# Patient Record
Sex: FEMALE | Race: WHITE | NOT HISPANIC OR LATINO | Employment: FULL TIME | URBAN - METROPOLITAN AREA
[De-identification: names, ages, dates, MRNs, and addresses within clinical notes are randomized per-mention and may not be internally consistent; named-entity substitution may affect disease eponyms.]

---

## 2021-10-29 ENCOUNTER — TELEPHONE (OUTPATIENT)
Dept: PSYCHIATRY | Facility: CLINIC | Age: 21
End: 2021-10-29

## 2021-11-05 ENCOUNTER — TELEPHONE (OUTPATIENT)
Dept: PSYCHIATRY | Facility: CLINIC | Age: 21
End: 2021-11-05

## 2022-02-15 ENCOUNTER — TELEPHONE (OUTPATIENT)
Dept: PSYCHIATRY | Facility: CLINIC | Age: 22
End: 2022-02-15

## 2022-07-23 ENCOUNTER — APPOINTMENT (EMERGENCY)
Dept: RADIOLOGY | Facility: HOSPITAL | Age: 22
End: 2022-07-23
Payer: COMMERCIAL

## 2022-07-23 ENCOUNTER — HOSPITAL ENCOUNTER (EMERGENCY)
Facility: HOSPITAL | Age: 22
Discharge: HOME | End: 2022-07-23
Attending: EMERGENCY MEDICINE | Admitting: EMERGENCY MEDICINE
Payer: COMMERCIAL

## 2022-07-23 VITALS
RESPIRATION RATE: 18 BRPM | SYSTOLIC BLOOD PRESSURE: 130 MMHG | OXYGEN SATURATION: 99 % | TEMPERATURE: 97.4 F | BODY MASS INDEX: 23.39 KG/M2 | DIASTOLIC BLOOD PRESSURE: 74 MMHG | HEART RATE: 80 BPM | WEIGHT: 104 LBS | HEIGHT: 56 IN

## 2022-07-23 DIAGNOSIS — R07.89 NON-CARDIAC CHEST PAIN: Primary | ICD-10-CM

## 2022-07-23 LAB
AMORPH PHOS CRY URNS QL MICRO: NORMAL /HPF
ANION GAP SERPL CALCULATED.3IONS-SCNC: 14 MMOL/L (ref 4–13)
BACTERIA UR QL AUTO: NORMAL /HPF
BASOPHILS # BLD AUTO: 0.03 THOUSANDS/ΜL (ref 0–0.1)
BASOPHILS NFR BLD AUTO: 0 % (ref 0–1)
BILIRUB UR QL STRIP: NEGATIVE
BUN SERPL-MCNC: 15 MG/DL (ref 5–25)
CALCIUM SERPL-MCNC: 8.5 MG/DL (ref 8.3–10.1)
CARDIAC TROPONIN I PNL SERPL HS: <2 NG/L
CHLORIDE SERPL-SCNC: 106 MMOL/L (ref 96–108)
CLARITY UR: CLEAR
CO2 SERPL-SCNC: 21 MMOL/L (ref 21–32)
COLOR UR: YELLOW
CREAT SERPL-MCNC: 0.68 MG/DL (ref 0.6–1.3)
EOSINOPHIL # BLD AUTO: 0.04 THOUSAND/ΜL (ref 0–0.61)
EOSINOPHIL NFR BLD AUTO: 0 % (ref 0–6)
ERYTHROCYTE [DISTWIDTH] IN BLOOD BY AUTOMATED COUNT: 12.8 % (ref 11.6–15.1)
EXT PREG TEST URINE: NEGATIVE
EXT. CONTROL ED NAV: NORMAL
GFR SERPL CREATININE-BSD FRML MDRD: 122 ML/MIN/1.73SQ M
GLUCOSE SERPL-MCNC: 76 MG/DL (ref 65–140)
GLUCOSE UR STRIP-MCNC: NEGATIVE MG/DL
HCT VFR BLD AUTO: 40.9 % (ref 34.8–46.1)
HGB BLD-MCNC: 13.3 G/DL (ref 11.5–15.4)
HGB UR QL STRIP.AUTO: ABNORMAL
IMM GRANULOCYTES # BLD AUTO: 0.01 THOUSAND/UL (ref 0–0.2)
IMM GRANULOCYTES NFR BLD AUTO: 0 % (ref 0–2)
KETONES UR STRIP-MCNC: ABNORMAL MG/DL
LEUKOCYTE ESTERASE UR QL STRIP: NEGATIVE
LYMPHOCYTES # BLD AUTO: 2.09 THOUSANDS/ΜL (ref 0.6–4.47)
LYMPHOCYTES NFR BLD AUTO: 22 % (ref 14–44)
MCH RBC QN AUTO: 29.8 PG (ref 26.8–34.3)
MCHC RBC AUTO-ENTMCNC: 32.5 G/DL (ref 31.4–37.4)
MCV RBC AUTO: 92 FL (ref 82–98)
MONOCYTES # BLD AUTO: 0.7 THOUSAND/ΜL (ref 0.17–1.22)
MONOCYTES NFR BLD AUTO: 8 % (ref 4–12)
NEUTROPHILS # BLD AUTO: 6.49 THOUSANDS/ΜL (ref 1.85–7.62)
NEUTS SEG NFR BLD AUTO: 70 % (ref 43–75)
NITRITE UR QL STRIP: NEGATIVE
NON-SQ EPI CELLS URNS QL MICRO: NORMAL /HPF
NRBC BLD AUTO-RTO: 0 /100 WBCS
PH UR STRIP.AUTO: 8.5 [PH]
PLATELET # BLD AUTO: 225 THOUSANDS/UL (ref 149–390)
PMV BLD AUTO: 10.5 FL (ref 8.9–12.7)
POTASSIUM SERPL-SCNC: 3.4 MMOL/L (ref 3.5–5.3)
PROT UR STRIP-MCNC: NEGATIVE MG/DL
RBC # BLD AUTO: 4.47 MILLION/UL (ref 3.81–5.12)
RBC #/AREA URNS AUTO: NORMAL /HPF
SODIUM SERPL-SCNC: 141 MMOL/L (ref 135–147)
SP GR UR STRIP.AUTO: 1.01 (ref 1–1.03)
UROBILINOGEN UR QL STRIP.AUTO: 1 E.U./DL
WBC # BLD AUTO: 9.36 THOUSAND/UL (ref 4.31–10.16)
WBC #/AREA URNS AUTO: NORMAL /HPF

## 2022-07-23 PROCEDURE — 99285 EMERGENCY DEPT VISIT HI MDM: CPT

## 2022-07-23 PROCEDURE — 71045 X-RAY EXAM CHEST 1 VIEW: CPT

## 2022-07-23 PROCEDURE — 93005 ELECTROCARDIOGRAM TRACING: CPT

## 2022-07-23 PROCEDURE — 84484 ASSAY OF TROPONIN QUANT: CPT

## 2022-07-23 PROCEDURE — 81025 URINE PREGNANCY TEST: CPT

## 2022-07-23 PROCEDURE — 80048 BASIC METABOLIC PNL TOTAL CA: CPT

## 2022-07-23 PROCEDURE — 81001 URINALYSIS AUTO W/SCOPE: CPT

## 2022-07-23 PROCEDURE — 36415 COLL VENOUS BLD VENIPUNCTURE: CPT

## 2022-07-23 PROCEDURE — 99284 EMERGENCY DEPT VISIT MOD MDM: CPT

## 2022-07-23 PROCEDURE — 85025 COMPLETE CBC W/AUTO DIFF WBC: CPT

## 2022-07-23 RX ORDER — HYDROXYZINE HYDROCHLORIDE 25 MG/1
25 TABLET, FILM COATED ORAL ONCE
Status: COMPLETED | OUTPATIENT
Start: 2022-07-23 | End: 2022-07-23

## 2022-07-23 RX ORDER — IBUPROFEN 600 MG/1
600 TABLET ORAL ONCE
Status: COMPLETED | OUTPATIENT
Start: 2022-07-23 | End: 2022-07-23

## 2022-07-23 RX ADMIN — HYDROXYZINE HYDROCHLORIDE 25 MG: 25 TABLET, FILM COATED ORAL at 15:36

## 2022-07-23 RX ADMIN — IBUPROFEN 600 MG: 600 TABLET, FILM COATED ORAL at 15:36

## 2022-07-23 NOTE — ED PROVIDER NOTES
History  Chief Complaint   Patient presents with    Shortness of Breath     States SOB and chest tightness started yesterday  Went to [facility] yesterday     This is a 24 y/o female with PMH bipolar disorder here for chest tightness and shortness of breath that started two days ago  Yesterday she went to [facility] ER for the same symptoms  She describes her chest pain as a 7/10 and says it is in the middle of her chest from her neck down to her xiphoid  She describes it as a squeezing or a tightness and it physically hurts to the touch  She also admits to shortness of breath that has been consistent both at rest and with activity  She describes it as "breathing fast " She says these symptoms have stayed the same for the past three days  Says she does heavy lifting at work  Denies any abdominal pain, fever, chills, nausea, vomiting, URI symptoms  Denies recent travel, being on birth control, sick contacts, recent hospitalizations other than her ER visit yesterday  She admits that she was started on Lamictal 3 weeks ago which is a new medication for her and she also takes hydroxyzine at night  States she has had panic attacks in the past but this feels different  She called her PCP but has not been able to see them yet  Denies thoughts of hurting herself or others, suicidal or homicidal thoughts, hearing voices or seeing things  No known allergies         History provided by:  Patient   used: No    Shortness of Breath  Severity:  Mild  Onset quality:  Gradual  Duration:  2 days  Timing:  Constant  Progression:  Unchanged  Chronicity:  Recurrent  Ineffective treatments:  NSAIDs  Associated symptoms: chest pain    Associated symptoms: no abdominal pain, no cough, no ear pain, no fever, no headaches, no hemoptysis, no neck pain, no sore throat, no vomiting and no wheezing    Risk factors: no hx of cancer, no hx of PE/DVT, no oral contraceptive use, no prolonged immobilization and no recent surgery        None       History reviewed  No pertinent past medical history  Past Surgical History:   Procedure Laterality Date    NASAL SEPTUM SURGERY         History reviewed  No pertinent family history  I have reviewed and agree with the history as documented  E-Cigarette/Vaping    E-Cigarette Use Never User      E-Cigarette/Vaping Substances    Nicotine No     THC No     CBD No     Flavoring No     Other No     Unknown No      Social History     Tobacco Use    Smoking status: Never Smoker    Smokeless tobacco: Never Used   Vaping Use    Vaping Use: Never used   Substance Use Topics    Alcohol use: Never    Drug use: Never       Review of Systems   Constitutional: Negative for chills and fever  HENT: Negative for congestion, ear pain and sore throat  Respiratory: Positive for chest tightness and shortness of breath  Negative for cough, hemoptysis and wheezing  Cardiovascular: Positive for chest pain  Negative for palpitations and leg swelling  Gastrointestinal: Negative for abdominal pain, nausea and vomiting  Musculoskeletal: Negative for back pain and neck pain  Skin: Negative for color change  Neurological: Negative for numbness and headaches  Psychiatric/Behavioral: Negative for behavioral problems and sleep disturbance  All other systems reviewed and are negative  Physical Exam  Physical Exam  Vitals and nursing note reviewed  Constitutional:       General: She is awake  Appearance: Normal appearance  She is well-developed  HENT:      Head: Normocephalic and atraumatic  Right Ear: Tympanic membrane, ear canal and external ear normal       Left Ear: Tympanic membrane, ear canal and external ear normal       Nose: Nose normal       Mouth/Throat:      Lips: Pink  Pharynx: Oropharynx is clear  Uvula midline  Eyes:      General: No scleral icterus  Extraocular Movements: Extraocular movements intact        Pupils: Pupils are equal, round, and reactive to light  Cardiovascular:      Rate and Rhythm: Normal rate and regular rhythm  Heart sounds: Normal heart sounds, S1 normal and S2 normal  No murmur heard  No gallop  Pulmonary:      Effort: Pulmonary effort is normal  No tachypnea, accessory muscle usage or respiratory distress  Breath sounds: Normal breath sounds  No decreased air movement  No decreased breath sounds, wheezing, rhonchi or rales  Comments: Lungs clear B/L  There is no wheezing, rales, rhonchi, decreased breath sounds or decreased air movement  Effort is normal  There is no tachypnea, accessory muscle usage or respiratory distress  Chest:      Chest wall: Tenderness present  No crepitus  Comments: There is reproducible chest tenderness along the middle of the chest wall  There is no notable bruising or swelling  Abdominal:      General: Abdomen is flat  Bowel sounds are normal       Palpations: Abdomen is soft  Tenderness: There is no abdominal tenderness  Comments: Abdomen is flat, soft  BS normal  There is no abdominal tenderness, rebound, guarding, rigidity noted  Musculoskeletal:         General: Normal range of motion  Cervical back: Full passive range of motion without pain, normal range of motion and neck supple  Right lower leg: No edema  Left lower leg: No edema  Skin:     General: Skin is warm and dry  Neurological:      General: No focal deficit present  Mental Status: She is alert  Psychiatric:         Attention and Perception: Attention and perception normal          Mood and Affect: Mood is anxious  Speech: Speech normal          Behavior: Behavior normal  Behavior is cooperative  Thought Content: Thought content does not include homicidal or suicidal ideation           Cognition and Memory: Cognition normal          Judgment: Judgment normal       Comments: Patient is anxious on exam  Her speech is quiet but rate and affect normal  Denies thoughts of hurting herself or others, suicidal ideations, homicidal ideations or plans  Denies delusions or hallucinations           Vital Signs  ED Triage Vitals [07/23/22 1324]   Temperature Pulse Respirations Blood Pressure SpO2   (!) 97 4 °F (36 3 °C) 88 18 125/79 99 %      Temp src Heart Rate Source Patient Position - Orthostatic VS BP Location FiO2 (%)   -- Monitor Sitting Right arm --      Pain Score       6           Vitals:    07/23/22 1324 07/23/22 1400 07/23/22 1500 07/23/22 1600   BP: 125/79 125/81 122/78 130/74   Pulse: 88 83 80 80   Patient Position - Orthostatic VS: Sitting Sitting Lying Held         Visual Acuity      ED Medications  Medications   hydrOXYzine HCL (ATARAX) tablet 25 mg (25 mg Oral Given 7/23/22 1536)   ibuprofen (MOTRIN) tablet 600 mg (600 mg Oral Given 7/23/22 1536)       Diagnostic Studies  Results Reviewed     Procedure Component Value Units Date/Time    HS Troponin 0hr (reflex protocol) [[id number]]  (Normal) Collected: 07/23/22 1539    Lab Status: Final result Specimen: Blood from Arm, Right Updated: 07/23/22 1613     hs TnI 0hr <2 ng/L     Basic metabolic panel [[id number]]  (Abnormal) Collected: 07/23/22 1539    Lab Status: Final result Specimen: Blood from Arm, Right Updated: 07/23/22 1600     Sodium 141 mmol/L      Potassium 3 4 mmol/L      Chloride 106 mmol/L      CO2 21 mmol/L      ANION GAP 14 mmol/L      BUN 15 mg/dL      Creatinine 0 68 mg/dL      Glucose 76 mg/dL      Calcium 8 5 mg/dL      eGFR 122 ml/min/1 73sq m     Narrative:      [place] guidelines for Chronic Kidney Disease (CKD):     Stage 1 with normal or high GFR (GFR > 90 mL/min/1 73 square meters)    Stage 2 Mild CKD (GFR = 60-89 mL/min/1 73 square meters)    Stage 3A Moderate CKD (GFR = 45-59 mL/min/1 73 square meters)    Stage 3B Moderate CKD (GFR = 30-44 mL/min/1 73 square meters)    Stage 4 Severe CKD (GFR = 15-29 mL/min/1 73 square meters)    Stage 5 End Stage CKD (GFR <15 mL/min/1 73 square meters)  Note: GFR calculation is accurate only with a steady state creatinine    CBC and differential [[id number]] Collected: 07/23/22 1539    Lab Status: Final result Specimen: Blood from Arm, Right Updated: 07/23/22 1546     WBC 9 36 Thousand/uL      RBC 4 47 Million/uL      Hemoglobin 13 3 g/dL      Hematocrit 40 9 %      MCV 92 fL      MCH 29 8 pg      MCHC 32 5 g/dL      RDW 12 8 %      MPV 10 5 fL      Platelets 692 Thousands/uL      nRBC 0 /100 WBCs      Neutrophils Relative 70 %      Immat GRANS % 0 %      Lymphocytes Relative 22 %      Monocytes Relative 8 %      Eosinophils Relative 0 %      Basophils Relative 0 %      Neutrophils Absolute 6 49 Thousands/µL      Immature Grans Absolute 0 01 Thousand/uL      Lymphocytes Absolute 2 09 Thousands/µL      Monocytes Absolute 0 70 Thousand/µL      Eosinophils Absolute 0 04 Thousand/µL      Basophils Absolute 0 03 Thousands/µL     Urine Microscopic [[id number]] Collected: 07/23/22 1513    Lab Status: Final result Specimen: Urine, Clean Catch Updated: 07/23/22 1539     RBC, UA 2-4 /hpf      WBC, UA 1-2 /hpf      Epithelial Cells Occasional /hpf      Bacteria, UA Occasional /hpf      AMORPH PHOSPATES Moderate /hpf     UA w Reflex to Microscopic w Reflex to Culture [[id number]]  (Abnormal) Collected: 07/23/22 1513    Lab Status: Final result Specimen: Urine, Clean Catch Updated: 07/23/22 1523     Color, UA Yellow     Clarity, UA Clear     Specific Gravity, UA 1 015     pH, UA 8 5     Leukocytes, UA Negative     Nitrite, UA Negative     Protein, UA Negative mg/dl      Glucose, UA Negative mg/dl      Ketones, UA 15 (1+) mg/dl      Urobilinogen, UA 1 0 E U /dl      Bilirubin, UA Negative     Occult Blood, UA Trace-lysed    POCT pregnancy, urine [[id number]]  (Normal) Resulted: 07/23/22 1520    Lab Status: Final result Updated: 07/23/22 1520     EXT PREG TEST UR (Ref: Negative) negative     Control valid                 X-ray chest 1 view portable    (Results Pending)              Procedures  ECG 12 Lead Documentation Only    Date/Time: 7/23/2022 5:18 PM  Performed by: Madina Workman PA-C  Authorized by: Madina Workman PA-C     Indications / Diagnosis:  Chest tightness, SOB  Patient location:  ED  Previous ECG:     Previous ECG:  Unavailable  Interpretation:     Interpretation: normal    Rate:     ECG rate:  78    ECG rate assessment: normal    Rhythm:     Rhythm: sinus rhythm    Comments:      HR 78 bpm  Normal sinus rhythm   No signs of acute ischemia, ectopy, arrhythmias             ED Course  ED Course as of 07/23/22 1859   Sat Jul 23, 2022   1610 Patient feels much better after receiving the hydroxyzine and motrin             HEART Risk Score    Flowsheet Row Most Recent Value   Heart Score Risk Calculator    History 0 Filed at: 07/23/2022 1614   ECG 0 Filed at: 07/23/2022 1614   Age 0 Filed at: 07/23/2022 1614   Risk Factors 0 Filed at: 07/23/2022 1614   Troponin 0 Filed at: 07/23/2022 1614   HEART Score 0 Filed at: 07/23/2022 1614                PERC Rule for PE    Flowsheet Row Most Recent Value   PERC Rule for PE    Age >=50 0 Filed at: 07/23/2022 1857   HR >=100 0 Filed at: 07/23/2022 1857   O2 Sat on room air < 95% 0 Filed at: 07/23/2022 1857   History of PE or DVT 0 Filed at: 07/23/2022 1857   Recent trauma or surgery 0 Filed at: 07/23/2022 1857   Hemoptysis 0 Filed at: 07/23/2022 1857   Exogenous estrogen 0 Filed at: 07/23/2022 1857   Unilateral leg swelling 0 Filed at: 07/23/2022 1857   PERC Rule for PE Results 0 Filed at: 07/23/2022 1857                  Wells' Criteria for PE    Flowsheet Row Most Recent Value   Wells' Criteria for PE    Clinical signs and symptoms of DVT 0 Filed at: 07/23/2022 1452   PE is primary diagnosis or equally likely 0 Filed at: 07/23/2022 1452   HR >100 0 Filed at: 07/23/2022 1452   Immobilization at least 3 days or Surgery in the previous 4 weeks 0 Filed at: 07/23/2022 1452   Previous, objectively diagnosed PE or DVT 0 Filed at: 07/23/2022 1452   Hemoptysis 0 Filed at: 07/23/2022 1452   Malignancy with treatment within 6 months or palliative 0 Filed at: 07/23/2022 1452   Wells' Criteria Total 0 Filed at: 07/23/2022 1452                [facility]  Number of Diagnoses or Management Options  Non-cardiac chest pain: new and requires workup  Diagnosis management comments: 22 y/o female here for chest pain and shortness of breath over the past 3 days  Patient went to [facility] ER yesterday for the same symptoms and had a chest pain workup done which was all negative  She describes her chest pain as tightness/squeezing and she feels like she is breathing fast  Patient has bipolar disorder and was recently started on lamictal 3 weeks ago  But says she has had panic attacks in the past and this feels different  No cardiac history, family history of young cardiac problems, asthma  ROS otherwise negative  Vitals stable  PE benign  Pt notably anxious when speaking  O2 sats 100%  Ordered CBC, CMP, Trop, EKG, CXR, UA, urine preg  Differential ACS, anxiety, costochondritis, pregnancy, anemia, dehydration  Gave patient hydroxyzine and motrin in ER for symptoms and she said this helping significantly  Labs and imaging all unremarkable  Wells 0 and heart score 0  PERCed out at 0  Patient stable to be discharged with instructions to take hydroxyzine and motrin every 4-6 hours  Instructed patient that she should follow up with her psychiatrist as this might be related to her new change in medication of lamictal  Also advised her to follow up with her PCP regarding her ER visit  She was given strict return to ED precautions both verbally and in discharge papers         Disposition  Final diagnoses:   Non-cardiac chest pain     Time reflects when diagnosis was documented in both MDM as applicable and the Disposition within this note     Time User Action Codes Description Comment    7/23/2022  4:24 PM Doroteo Plasencia Add [R07 [id number]] Non-cardiac chest pain       ED Disposition     ED Disposition   Discharge    Condition   Stable    Date/Time   Sat Jul 23, 2022  4:24 PM    Comment   Aydin Bottoms discharge to home/self care  Follow-up Information     Follow up With Specialties Details Why Contact Info Additional Information    Psychiatry  Call  regarding follow-up evaluation for ER visit      Cathy Arthur MD Internal Medicine Call  for follow-up evaluation regarding ER visit [address]  [place] [address]  [facility] [phone]        [address] Emergency Department Emergency Medicine Go to  if you develop worsening chest pain that is worse/different from before, difficulty breathing such that your lips/skin/nails turn blue, feel faint/lightheaded like you may pass out, feel confused, have thoughts of hurting yourself or others [address] [place] [address], [phone]  [address] [facility] Emergency Department, [address] [place] [address], [facility], [facility] [address]          There are no discharge medications for this patient  No discharge procedures on file      PDMP Review     None          ED Provider  Electronically Signed by           Stefani Gillette PA-C  07/23/22 [id number] Dev Lim PA-C  07/23/22 [id number] Dev Lim PA-C  07/23/22 3557

## 2022-07-23 NOTE — DISCHARGE INSTRUCTIONS
Take 25 mg hydroxyzine every 4-6 hours as needed for chest tightness and shortness of breath symptoms   Take 600 mg ibuprofen every 4-6 hours for chest pain  Make sure you are resting and avoiding lifting heavy objects   You can use ice or heat on your chest to see if it relieves your symptoms   Follow up with your psychiatrist and inform them of your symptoms to see if it is the new medication you were placed on   Return to the ER if you develop worsening chest pain that is worse/different from before, difficulty breathing such that your lips, skin or nails turn blue, feel faint or lightheaded like you may pass out, feel sweaty/nauseous/start to vomit, feel confused, have any thoughts of hurting yourself or others

## 2022-07-24 LAB
ATRIAL RATE: 78 BPM
P AXIS: 46 DEGREES
PR INTERVAL: 138 MS
QRS AXIS: 83 DEGREES
QRSD INTERVAL: 72 MS
QT INTERVAL: 374 MS
QTC INTERVAL: 426 MS
T WAVE AXIS: 44 DEGREES
VENTRICULAR RATE: 78 BPM

## 2022-07-24 PROCEDURE — 93010 ELECTROCARDIOGRAM REPORT: CPT | Performed by: INTERNAL MEDICINE

## 2023-08-15 ENCOUNTER — TELEPHONE (OUTPATIENT)
Dept: PSYCHIATRY | Facility: CLINIC | Age: 23
End: 2023-08-15

## 2023-08-15 NOTE — LETTER
Dear Shaneka Hernandez : We are contacting you because your name is currently included on the [facility] wait-list for Talk Therapy and/or Medication Management. (Please [facility] which services are needed)     In our efforts to provide the highest quality care, [facility] has begun the process of upgrading our behavioral health systems to increase efficiency and expedite delivery of services. As part of this process, we ask you to please confirm your continued interest in the services above. If you are no longer interested or in need, please aida “No” in the area below. If you are still interested and in need, please aida “Yes” and provide your most current demographic and insurance information within 15 days. If we do not receive confirmation from you by 2023 your information will not be included in the system upgrade and your place on the waitlist will be lost.     Thank you in advance for your patience and understanding and we apologize for any inconvenience this may cause. Patient Name and :    Still in need of services: Yes or No     Current Address:     Phone#:     Best time to receive a call: Insurance Carrier:      Policy/ID#: Group#: Insurance Services Phone#:      What is your current presenting problem? Open to virtual talk therapy: Yes or No      We will call you to do an Intake when an appointment becomes available. You can send this information back to us in any of the ways below:    Email: [email or web address] Landrum  Fax#:  [phone]  Mail:   Phyllis [facility]             [address] [facility], [address] [place] [address]

## 2023-08-15 NOTE — TELEPHONE ENCOUNTER
Sent Wait List Letter VIA [employer] . Verify patients need of services from wait list after 15 days.  If no communication remove from NON-REFERRAL wait list.

## 2024-10-01 ENCOUNTER — OFFICE VISIT (OUTPATIENT)
Dept: FAMILY MEDICINE CLINIC | Facility: CLINIC | Age: 24
End: 2024-10-01

## 2024-10-01 ENCOUNTER — TELEPHONE (OUTPATIENT)
Dept: ADMINISTRATIVE | Facility: OTHER | Age: 24
End: 2024-10-01

## 2024-10-01 VITALS
WEIGHT: 100.4 LBS | HEART RATE: 80 BPM | BODY MASS INDEX: 21.66 KG/M2 | SYSTOLIC BLOOD PRESSURE: 96 MMHG | DIASTOLIC BLOOD PRESSURE: 78 MMHG | HEIGHT: 57 IN | TEMPERATURE: 97.8 F | OXYGEN SATURATION: 99 %

## 2024-10-01 DIAGNOSIS — Z23 ENCOUNTER FOR IMMUNIZATION: ICD-10-CM

## 2024-10-01 DIAGNOSIS — F31.9 BIPOLAR DEPRESSION (HCC): ICD-10-CM

## 2024-10-01 DIAGNOSIS — Z00.00 ADULT GENERAL MEDICAL EXAMINATION: Primary | ICD-10-CM

## 2024-10-01 DIAGNOSIS — Z11.1 SCREENING FOR TUBERCULOSIS: ICD-10-CM

## 2024-10-01 PROBLEM — F41.9 ANXIETY: Status: ACTIVE | Noted: 2024-10-01

## 2024-10-01 PROCEDURE — 90471 IMMUNIZATION ADMIN: CPT

## 2024-10-01 PROCEDURE — 99385 PREV VISIT NEW AGE 18-39: CPT

## 2024-10-01 PROCEDURE — 86580 TB INTRADERMAL TEST: CPT

## 2024-10-01 PROCEDURE — 90715 TDAP VACCINE 7 YRS/> IM: CPT

## 2024-10-01 RX ORDER — ARIPIPRAZOLE 15 MG/1
TABLET ORAL
COMMUNITY

## 2024-10-01 RX ORDER — SERTRALINE HYDROCHLORIDE 100 MG/1
TABLET, FILM COATED ORAL
COMMUNITY

## 2024-10-01 RX ORDER — FLUOXETINE 40 MG/1
1 CAPSULE ORAL DAILY
COMMUNITY

## 2024-10-01 NOTE — PROGRESS NOTES
Adult Annual Physical  Name: Katya Maciel      : 2000      MRN: [id number]  Encounter Provider: Marta Zelaya PA-C  Encounter Date: 10/1/2024   Encounter department: [facility]    Assessment & Plan  Adult general medical examination  Patient presents today to establish care with our practice and is due for an annual physical. Patient's medical history and medication list were reviewed and updated. Annual physical questionnaire was given to review current diet, exercise level, sleep health, dental health, visual health, hearing status.    Preventative health needs were reviewed and assessed today:   Immunizations:   Flu -will update at a later time  COVID -no vaccines  Tdap -updated today, counseling was given and patient is agreeable with vaccine being given today  TB test - required by work, will return on Thursday for recheck.  Paperwork was filled out for her work and will be returned to her once TB test is rechecked.    Pap-she believes she had 1 done about 2 years ago but cannot remember where, discussed she will be due for it next year then and she can establish with our [facility]'s at any point in time.    Physical examination completed today.  Patient appears to be in very good health for her age.  She eats a regular diet and stays active with her job as a .  We discussed sleep health as well, recommended melatonin with any trouble falling asleep.    She will follow-up in 1 year or sooner with any acute concerns.  Will return on Thursday for TB test recheck.       Bipolar depression (HCC)  Patient is diagnosed with bipolar depression and anxiety, currently follows with [facility] psychiatry and is prescribed Abilify, Prozac and Zoloft.  She follows with them monthly and states her mental health is stable.  No SI or HI.  Continue regular follow-up.         Encounter for immunization    Orders:    TDAP VACCINE GREATER THAN OR EQUAL TO 8YO IM    Screening  for tuberculosis    Orders:    TB Skin Test    Immunizations and preventive care screenings were discussed with patient today. Appropriate education was printed on patient's after visit summary.    Counseling:  Alcohol/drug use: discussed moderation in alcohol intake, the recommendations for healthy alcohol use, and avoidance of illicit drug use.  Dental Health: discussed importance of regular tooth brushing, flossing, and dental visits.  Injury prevention: discussed safety/seat belts, safety helmets, smoke detectors, carbon monoxide detectors, and smoking near bedding or upholstery.  Sexual health: discussed sexually transmitted diseases, partner selection, use of condoms, avoidance of unintended pregnancy, and contraceptive alternatives.  Exercise: the importance of regular exercise/physical activity was discussed. Recommend exercise 3-5 times per week for at least 30 minutes.          History of Present Illness     Adult Annual Physical:  Patient presents for annual physical.     Diet and Physical Activity:  - Diet/Nutrition: well balanced diet, consuming 3-5 servings of fruits/vegetables daily, adequate fiber intake, adequate whole grain intake and limited junk food.  - Exercise: walking. works as a     Depression Screening:  - PHQ-2 Score: 0    General Health:  - Sleep: 7-8 hours of sleep on average and sleeps well.  - Hearing: normal hearing bilateral ears.  - Vision: no vision problems.  - Dental: regular dental visits and brushes teeth twice daily.    /GYN Health:  - Follows with GYN: yes.   - Menopause: premenopausal.   - Last menstrual cycle: 9/1/2024.   - History of STDs: no  - Contraception:. none      Review of Systems   Constitutional:  Negative for chills, fever and unexpected weight change.   Respiratory:  Negative for cough, chest tightness and shortness of breath.    Cardiovascular:  Negative for chest pain and palpitations.   Gastrointestinal:  Negative for abdominal pain, blood in  "stool and vomiting.   Genitourinary:  Negative for dysuria, hematuria and menstrual problem.   Musculoskeletal:  Negative for arthralgias, back pain and myalgias.   Neurological:  Negative for dizziness, seizures, syncope and headaches.   Hematological:  Does not bruise/bleed easily.   Psychiatric/Behavioral:  Negative for behavioral problems and confusion.    All other systems reviewed and are negative.        Objective     BP 96/78 (BP Location: Left arm, Patient Position: Sitting, Cuff Size: Adult)   Pulse 80   Temp 97.8 °F (36.6 °C)   Ht 4' 8.75\" (1.441 m)   Wt 45.5 kg (100 lb 6.4 oz)   LMP 09/01/2024 (Approximate)   SpO2 99%   BMI 21.92 kg/m²     Physical Exam  Vitals and nursing note reviewed.   Constitutional:       General: She is not in acute distress.     Appearance: Normal appearance. She is well-developed and normal weight. She is not ill-appearing.   HENT:      Head: Normocephalic and atraumatic.      Right Ear: Tympanic membrane normal.      Left Ear: Tympanic membrane normal.      Nose: Nose normal.      Mouth/Throat:      Mouth: Mucous membranes are moist.      Pharynx: Oropharynx is clear. No posterior oropharyngeal erythema.   Eyes:      Pupils: Pupils are equal, round, and reactive to light.   Neck:      Vascular: No carotid bruit.   Cardiovascular:      Rate and Rhythm: Normal rate and regular rhythm.      Pulses: Normal pulses.      Heart sounds: No murmur heard.  Pulmonary:      Effort: Pulmonary effort is normal. No respiratory distress.      Breath sounds: Normal breath sounds.   Abdominal:      General: Bowel sounds are normal. There is no distension.      Palpations: Abdomen is soft.      Tenderness: There is no abdominal tenderness.   Musculoskeletal:         General: No swelling.      Cervical back: Normal range of motion.      Right lower leg: No edema.      Left lower leg: No edema.   Lymphadenopathy:      Cervical: No cervical adenopathy.   Skin:     General: Skin is warm and " dry.   Neurological:      General: No focal deficit present.      Mental Status: She is alert and oriented to person, place, and time. Mental status is at baseline.   Psychiatric:         Mood and Affect: Mood normal.         Behavior: Behavior normal.         Cognition and Memory: Cognition normal.         Judgment: Judgment normal.

## 2024-10-01 NOTE — LETTER
Procedure Request Form: Cervical Cancer Screening      Date Requested: 10/01/24  Patient: Katya Maciel  Patient : 2000   Referring Provider: Marta Zelaya PA-C        Date of Procedure ______________________________       The above patient has informed us that they have completed their   most recent Cervical Cancer Screening at your facility. Please complete   this form and attach all corresponding procedure reports/results.    Comments DOS:2022 or most recent  ____________________________________________________________________  ____________________________________________________________________  ____________________________________________________________________    Facility Completing Procedure _________________________________________    Form Completed By (print name) _______________________________________      Signature __________________________________________________________      These reports are needed for  compliance.    Please fax this completed form and a copy of the procedure report to our office located at [address] [place], [place] [address] [place] [address] as soon as possible to Fax [phone] joan Solis: Phone [phone]    We thank you for your assistance in treating our mutual patient.

## 2024-10-01 NOTE — ASSESSMENT & PLAN NOTE
Patient is diagnosed with bipolar depression and anxiety, currently follows with [facility] psychiatry and is prescribed Abilify, Prozac and Zoloft.  She follows with them monthly and states her mental health is stable.  No SI or HI.  Continue regular follow-up.

## 2024-10-01 NOTE — TELEPHONE ENCOUNTER
Upon review of the In Basket request and the patient's chart, initial outreach has been made via fax to facility. Please see Contacts section for details.     Thank you  Irma Tong MA

## 2024-10-01 NOTE — LETTER
Procedure Request Form: Cervical Cancer Screening      Date Requested: 10/18/24  Patient: Katya Maciel  Patient : 2000   Referring Provider: Marta Zelaya PA-C        Date of Procedure ______________________________       The above patient has informed us that they have completed their   most recent Cervical Cancer Screening at your facility. Please complete   this form and attach all corresponding procedure reports/results.    Comments __________________________________________________________  ____________________________________________________________________  ____________________________________________________________________  ____________________________________________________________________    Facility Completing Procedure _________________________________________    Form Completed By (print name) _______________________________________      Signature __________________________________________________________      These reports are needed for  compliance.    Please fax this completed form and a copy of the procedure report to our office located at [address] [place] [address] as soon as possible to Fax [phone] joan Solis: Phone [phone]    We thank you for your assistance in treating our mutual patient.

## 2024-10-01 NOTE — TELEPHONE ENCOUNTER
----- Message from Susannah BRAUN sent at 10/1/2024  9:57 AM EDT -----  Regarding: Quality Outreach  10/01/24 9:57 AM    Hello, our patient Katya Maciel has had Pap Smear (HPV) aka Cervical Cancer Screening completed/performed. Please assist in updating the patient chart by making an External outreach to [facility] facility located in [place]. The date of service is 2022.    Thank you,  Susannah Pereira MA  [facility]

## 2024-10-03 ENCOUNTER — CLINICAL SUPPORT (OUTPATIENT)
Dept: FAMILY MEDICINE CLINIC | Facility: CLINIC | Age: 24
End: 2024-10-03

## 2024-10-03 DIAGNOSIS — Z11.1 SCREENING FOR TUBERCULOSIS: Primary | ICD-10-CM

## 2024-10-03 LAB
INDURATION: 0 MM
TB SKIN TEST: NEGATIVE

## 2024-10-18 NOTE — TELEPHONE ENCOUNTER
As a follow-up, a second attempt has been made for outreach via fax to facility. Please see Contacts section for details.    Thank you  Irma Tong MA

## 2024-10-24 NOTE — TELEPHONE ENCOUNTER
Upon review of the In Basket request we were able to locate, review, and update the patient chart as requested for Pap Smear (HPV) aka Cervical Cancer Screening.    Any additional questions or concerns should be emailed to the Practice Liaisons via the appropriate education email address, please do not reply via In Basket.    Thank you  Irma Tong MA   [facility]

## 2025-02-12 ENCOUNTER — TELEPHONE (OUTPATIENT)
Dept: FAMILY MEDICINE CLINIC | Facility: CLINIC | Age: 25
End: 2025-02-12

## 2025-02-12 ENCOUNTER — OFFICE VISIT (OUTPATIENT)
Dept: FAMILY MEDICINE CLINIC | Facility: CLINIC | Age: 25
End: 2025-02-12
Payer: COMMERCIAL

## 2025-02-12 ENCOUNTER — APPOINTMENT (OUTPATIENT)
Dept: LAB | Facility: CLINIC | Age: 25
End: 2025-02-12
Payer: COMMERCIAL

## 2025-02-12 VITALS
TEMPERATURE: 98.2 F | DIASTOLIC BLOOD PRESSURE: 70 MMHG | SYSTOLIC BLOOD PRESSURE: 110 MMHG | BODY MASS INDEX: 22.66 KG/M2 | OXYGEN SATURATION: 98 % | HEART RATE: 123 BPM | WEIGHT: 103.8 LBS

## 2025-02-12 DIAGNOSIS — F31.9 BIPOLAR DEPRESSION (HCC): ICD-10-CM

## 2025-02-12 DIAGNOSIS — F50.00 ANOREXIA NERVOSA: ICD-10-CM

## 2025-02-12 DIAGNOSIS — R00.0 TACHYCARDIA: ICD-10-CM

## 2025-02-12 DIAGNOSIS — G25.3 MYOCLONUS: ICD-10-CM

## 2025-02-12 DIAGNOSIS — G25.3 MYOCLONUS: Primary | ICD-10-CM

## 2025-02-12 LAB
BASOPHILS # BLD AUTO: 0.03 THOUSANDS/ΜL (ref 0–0.1)
BASOPHILS NFR BLD AUTO: 0 % (ref 0–1)
EOSINOPHIL # BLD AUTO: 0.02 THOUSAND/ΜL (ref 0–0.61)
EOSINOPHIL NFR BLD AUTO: 0 % (ref 0–6)
ERYTHROCYTE [DISTWIDTH] IN BLOOD BY AUTOMATED COUNT: 14.7 % (ref 11.6–15.1)
HCT VFR BLD AUTO: 38.1 % (ref 34.8–46.1)
HGB BLD-MCNC: 12.2 G/DL (ref 11.5–15.4)
IMM GRANULOCYTES # BLD AUTO: 0.06 THOUSAND/UL (ref 0–0.2)
IMM GRANULOCYTES NFR BLD AUTO: 1 % (ref 0–2)
LYMPHOCYTES # BLD AUTO: 0.26 THOUSANDS/ΜL (ref 0.6–4.47)
LYMPHOCYTES NFR BLD AUTO: 4 % (ref 14–44)
MCH RBC QN AUTO: 26.9 PG (ref 26.8–34.3)
MCHC RBC AUTO-ENTMCNC: 32 G/DL (ref 31.4–37.4)
MCV RBC AUTO: 84 FL (ref 82–98)
MONOCYTES # BLD AUTO: 0.37 THOUSAND/ΜL (ref 0.17–1.22)
MONOCYTES NFR BLD AUTO: 5 % (ref 4–12)
NEUTROPHILS # BLD AUTO: 6.19 THOUSANDS/ΜL (ref 1.85–7.62)
NEUTS SEG NFR BLD AUTO: 90 % (ref 43–75)
NRBC BLD AUTO-RTO: 0 /100 WBCS
PLATELET # BLD AUTO: 206 THOUSANDS/UL (ref 149–390)
PMV BLD AUTO: 10.6 FL (ref 8.9–12.7)
RBC # BLD AUTO: 4.54 MILLION/UL (ref 3.81–5.12)
TSH SERPL DL<=0.05 MIU/L-ACNC: 2.37 UIU/ML (ref 0.45–4.5)
WBC # BLD AUTO: 6.93 THOUSAND/UL (ref 4.31–10.16)

## 2025-02-12 PROCEDURE — 99215 OFFICE O/P EST HI 40 MIN: CPT

## 2025-02-12 PROCEDURE — 36415 COLL VENOUS BLD VENIPUNCTURE: CPT

## 2025-02-12 PROCEDURE — 93000 ELECTROCARDIOGRAM COMPLETE: CPT

## 2025-02-12 PROCEDURE — 84443 ASSAY THYROID STIM HORMONE: CPT

## 2025-02-12 PROCEDURE — 80053 COMPREHEN METABOLIC PANEL: CPT

## 2025-02-12 PROCEDURE — 85025 COMPLETE CBC W/AUTO DIFF WBC: CPT

## 2025-02-12 NOTE — LETTER
February 12, 2025     Katya Maciel  [address] [place] [address]    Patient: Katya Maciel   YOB: 2000   Date of Visit: 2/12/2025       Dear Dr. Kan,     I am the primary care provider for our mutual patient Katya Maciel, date of birth 2000.  You are currently managing her bipolar disorder and Katya reports to me today that she is currently taking Abilify 15 mg, Zoloft 200 mg and Prozac 40 mg daily. She presented to the clinic today with a 2 week history of head twitches. 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  She endorses headache, nausea, vomiting and lightheadedness as well, which started within the past 24 to 48 hours.  Based on discussion today, I am concerned about possible serotonin syndrome due to her being on two SSRI medications.  I am completing blood work for her, including a CBC, CMP and TSH.  EKG today was completed which showed sinus tachycardia at rate 110 bpm.  I am also sending her for a CT scan of the head to be completed within the next week.  I did give her strict ER precautions if any symptoms were to worsen or any other new neurological symptoms develop.    I wanted to make you aware of the situation with her new symptoms that have developed within the past 2 weeks.  Again, I have some concern for possible serotonin syndrome and would ask that we evaluate patient's medication regimen to see if this could be contributing to her concerning symptoms.         If you have questions, please do not hesitate to call me. I look forward to following Katya along with you.         Sincerely,        Marta Zelaya PA-C        CC: No Recipients

## 2025-02-12 NOTE — PROGRESS NOTES
"Name: Katya Maciel      : 2000      MRN: [id number]  Encounter Provider: Marta Zelaya PA-C  Encounter Date: 2025   Encounter department: [facility]  :  Assessment & Plan  Myoclonus  Patient presents today for evaluation of right head twitching and have been present for the past 2 weeks.  Reports no confusion or altered mental status.  No history of seizures.  She endorses a headache for the past 2 weeks.  Last night had an episode of nausea and vomiting.  She has been lightheaded since last night.  She reports the \"twitching\" has gotten worse throughout the past 2 weeks.  No recent medication changes.    On examination today, there are several instances of \"twitch\" being seen- at least 4-5 times during our appointment today. Patient having myoclonic jerks of the head, always favoring the right side. No other neurological deficits on today's examination. Upon medication review, it appears patient is on two SSRIs and Abilify. No recent dosage changes but with her present neurological symptoms, I have high concern for possible serotonin syndrome. I have recommended strongly that she reach out to her psychiatrist to make them aware of her symptoms and our plan for today. We will plan to complete lab work to see if TSH, CBC and CMP show any abnormalities - r/o electrolytes, thyroid disorder, or infections. Due to myoclonic jerking, will need to complete CT scan to rule out other intracranial abnormalities. ECG shows tachycardia, rate 110bpm. Possibly linked to possible serotonin syndrome vs dehydration from recent vomiting over night.     She will get lab work completed today and CT scan within the next week. She will be reaching out to psychiatry for them to evaluate her medications. I have given her extensive ER recommendations in the event any symptoms worsen, or if any other neurological symptoms such as numbness, facial droop, confusion, or seizure activity occur - she is " "agreeable with this plan. I will also be sending a letter to her psychiatrist to update on the plan and my concerns with her current medication regimen.   Orders:  •  CT head wo contrast; Future  •  TSH, 3rd generation with Free T4 reflex; Future  •  CBC and differential; Future  •  Comprehensive metabolic panel; Future    Tachycardia  Patient tachycardic on today's examination.  EKG performed, sinus tachycardia at rate 110 bpm otherwise normal ECG.  Possible dehydration from vomiting last night versus serotonin syndrome.  Will send patient for lab work for further evaluation and strongly encouraged proper hydration.  If any lightheadedness worsens would recommend ER evaluation for possible IV fluids.  Orders:  •  POCT ECG    Anorexia nervosa  Patient currently working with [facility].  Reports good eating habits without intentional vomiting.       Bipolar depression (HCC)  Currently following with [facility].  Patient has been taking Abilify 15 mg, Prozac 40 mg and Zoloft 200 mg daily for the past 2 to 3 years under the direction of [facility] therapy, Dr. Kan.  Reports no recent medication changes.  Notes anxiety has increased for the past 2 weeks.  Typically does not have side effects from this medication regimen.  Will plan to send my office note today to [facility] so they are aware of her current symptoms and to evaluate her medication regimen to determine if this is the safest option for her at this time.              History of Present Illness   Patient presents today for evaluation of head \"twitching\".  She reports symptoms started about 2 weeks ago and are worsening day by day.  She reports it started off being very subtle and only occurring a few times a day and has now progressed to happening every few minutes.  She reports symptoms are always present on the right side.  She endorses a headache for the past 2 weeks as well.  She reports onset of nausea last night which caused " several episodes of vomiting.  No abdominal pain.  She also endorses lightheadedness that started last night.  She has had no other tic-like symptoms.  She has not had any recent changes to any of her psychiatry medications.  She denies confusion and altered mental status.  She denies any changes with her vision.  She denies any numbness or tingling in the face.  She denies any abnormal speech or facial droop.        Review of Systems   Constitutional:  Negative for chills, fatigue and fever.   Respiratory:  Negative for cough, chest tightness and shortness of breath.    Cardiovascular:  Negative for chest pain, palpitations and leg swelling.   Gastrointestinal:  Positive for nausea and vomiting. Negative for abdominal pain, constipation and diarrhea.   Musculoskeletal:  Negative for neck pain and neck stiffness.   Skin:  Negative for rash.   Neurological:  Positive for tremors and light-headedness. Negative for dizziness, seizures, speech difficulty, weakness, numbness and headaches.       Objective   /70 (BP Location: Left arm, Patient Position: Sitting, Cuff Size: Standard)   Pulse (!) 123   Temp 98.2 °F (36.8 °C) (Temporal)   Wt 47.1 kg (103 lb 12.8 oz)   SpO2 98%   BMI 22.66 kg/m²      Physical Exam  Vitals and nursing note reviewed.   Constitutional:       General: She is not in acute distress.     Appearance: Normal appearance. She is normal weight. She is not ill-appearing.   HENT:      Head: Normocephalic and atraumatic.      Right Ear: Tympanic membrane normal.      Left Ear: Tympanic membrane normal.      Nose: Nose normal.      Mouth/Throat:      Mouth: Mucous membranes are moist.      Pharynx: Oropharynx is clear. No oropharyngeal exudate or posterior oropharyngeal erythema.   Eyes:      General: No visual field deficit.     Extraocular Movements: Extraocular movements intact.      Pupils: Pupils are equal, round, and reactive to light.   Cardiovascular:      Rate and Rhythm: Regular rhythm.  Tachycardia present.      Heart sounds: No murmur heard.  Pulmonary:      Effort: Pulmonary effort is normal. No respiratory distress.      Breath sounds: Normal breath sounds. No wheezing or rhonchi.   Abdominal:      General: Bowel sounds are normal.      Palpations: Abdomen is soft.      Tenderness: There is no abdominal tenderness.   Musculoskeletal:      Cervical back: Normal range of motion.      Right lower leg: No edema.      Left lower leg: No edema.   Lymphadenopathy:      Cervical: No cervical adenopathy.   Skin:     General: Skin is warm and dry.      Coloration: Skin is not cyanotic or pale.   Neurological:      General: No focal deficit present.      Mental Status: She is alert and oriented to person, place, and time. Mental status is at baseline.      GCS: GCS eye subscore is 4. GCS verbal subscore is 5. GCS motor subscore is 6.      Cranial Nerves: Cranial nerves 2-12 are intact. No cranial nerve deficit or facial asymmetry.      Sensory: Sensation is intact.      Motor: No weakness, seizure activity or pronator drift.      Coordination: Coordination is intact.      Gait: Gait is intact.      Comments: Subtle myoclonic head jerk to the right side visualized on examination approximately 4-5 times.   Sensory testing normal. No facial droop. No abnormal speech. Able to perform motor functions without difficulty.   Psychiatric:         Mood and Affect: Mood normal.         Behavior: Behavior normal.         Judgment: Judgment normal.

## 2025-02-12 NOTE — TELEPHONE ENCOUNTER
Contact [facility] per PCP ([phone]). They gave me fax number ([phone]) and email ([email or web address])    Faxed and emailed the letter written by Marta. Also was scanned into chart    Pt sees Loreta Canales NP at [facility]

## 2025-02-12 NOTE — ASSESSMENT & PLAN NOTE
Patient currently working with [facility].  Reports good eating habits without intentional vomiting.

## 2025-02-12 NOTE — ASSESSMENT & PLAN NOTE
Currently following with [facility].  Patient has been taking Abilify 15 mg, Prozac 40 mg and Zoloft 200 mg daily for the past 2 to 3 years under the direction of [facility], Dr. Kan.  Reports no recent medication changes.  Notes anxiety has increased for the past 2 weeks.  Typically does not have side effects from this medication regimen.  Will plan to send my office note today to [facility] so they are aware of her current symptoms and to evaluate her medication regimen to determine if this is the safest option for her at this time.

## 2025-02-13 ENCOUNTER — RESULTS FOLLOW-UP (OUTPATIENT)
Dept: FAMILY MEDICINE CLINIC | Facility: CLINIC | Age: 25
End: 2025-02-13

## 2025-02-13 LAB
ALBUMIN SERPL BCG-MCNC: 4.5 G/DL (ref 3.5–5)
ALP SERPL-CCNC: 52 U/L (ref 34–104)
ALT SERPL W P-5'-P-CCNC: 12 U/L (ref 7–52)
ANION GAP SERPL CALCULATED.3IONS-SCNC: 14 MMOL/L (ref 4–13)
AST SERPL W P-5'-P-CCNC: 21 U/L (ref 13–39)
BILIRUB SERPL-MCNC: 0.46 MG/DL (ref 0.2–1)
BUN SERPL-MCNC: 25 MG/DL (ref 5–25)
CALCIUM SERPL-MCNC: 9.2 MG/DL (ref 8.4–10.2)
CHLORIDE SERPL-SCNC: 101 MMOL/L (ref 96–108)
CO2 SERPL-SCNC: 23 MMOL/L (ref 21–32)
CREAT SERPL-MCNC: 0.71 MG/DL (ref 0.6–1.3)
GFR SERPL CREATININE-BSD FRML MDRD: 117 ML/MIN/1.73SQ M
GLUCOSE SERPL-MCNC: 103 MG/DL (ref 65–140)
POTASSIUM SERPL-SCNC: 4.1 MMOL/L (ref 3.5–5.3)
PROT SERPL-MCNC: 7 G/DL (ref 6.4–8.4)
SODIUM SERPL-SCNC: 138 MMOL/L (ref 135–147)

## 2025-02-17 ENCOUNTER — HOSPITAL ENCOUNTER (OUTPATIENT)
Dept: CT IMAGING | Facility: HOSPITAL | Age: 25
Discharge: HOME | End: 2025-02-17
Payer: COMMERCIAL

## 2025-02-17 DIAGNOSIS — G25.3 MYOCLONUS: ICD-10-CM

## 2025-02-17 PROCEDURE — 70450 CT HEAD/BRAIN W/O DYE: CPT

## 2025-02-19 DIAGNOSIS — G25.3 MYOCLONUS: Primary | ICD-10-CM

## 2025-04-22 ENCOUNTER — CONSULT (OUTPATIENT)
Dept: NEUROLOGY | Facility: CLINIC | Age: 25
End: 2025-04-22
Payer: COMMERCIAL

## 2025-04-22 VITALS
HEART RATE: 88 BPM | WEIGHT: 102.1 LBS | OXYGEN SATURATION: 99 % | BODY MASS INDEX: 22.29 KG/M2 | SYSTOLIC BLOOD PRESSURE: 102 MMHG | TEMPERATURE: 98.5 F | DIASTOLIC BLOOD PRESSURE: 62 MMHG

## 2025-04-22 DIAGNOSIS — F95.9 TIC: Primary | ICD-10-CM

## 2025-04-22 DIAGNOSIS — R25.9 ABNORMAL MOVEMENTS: ICD-10-CM

## 2025-04-22 PROCEDURE — 99204 OFFICE O/P NEW MOD 45 MIN: CPT | Performed by: STUDENT IN AN ORGANIZED HEALTH CARE EDUCATION/TRAINING PROGRAM

## 2025-04-22 NOTE — PATIENT INSTRUCTIONS
-As we discussed, reassuringly this does not appear to be primarily neurologic, in part because you have no other neurologic signs or symptoms present  -I suspect this is in fact primarily Psychiatric in nature, either a direct new manifestation of Bipolar or similar or a medication side effect; however it is reasonable to continue to monitor  -I am placing a referral to physical therapy for their input as to whether they have any exercises or recommendations that may help.

## 2025-04-22 NOTE — PROGRESS NOTES
Review of Systems   Constitutional:  Negative for appetite change, fatigue and fever.   HENT: Negative.  Negative for hearing loss, tinnitus, trouble swallowing and voice change.    Eyes: Negative.  Negative for photophobia, pain and visual disturbance.   Respiratory: Negative.  Negative for shortness of breath.    Cardiovascular: Negative.  Negative for palpitations.   Gastrointestinal: Negative.  Negative for nausea and vomiting.   Endocrine: Negative.  Negative for cold intolerance.   Genitourinary: Negative.  Negative for dysuria, frequency and urgency.   Musculoskeletal:  Negative for back pain, gait problem, myalgias, neck pain and neck stiffness.   Skin: Negative.  Negative for rash.   Allergic/Immunologic: Negative.    Neurological:  Negative for dizziness, tremors, seizures, syncope, facial asymmetry, speech difficulty, weakness, light-headedness, numbness and headaches.        Jerking of head   Hematological: Negative.  Does not bruise/bleed easily.   Psychiatric/Behavioral: Negative.  Negative for confusion, hallucinations and sleep disturbance.    All other systems reviewed and are negative.

## 2025-04-22 NOTE — PROGRESS NOTES
Name: Katya Maciel      : 2000      MRN: [id number]  Encounter Provider: Alexsander Ingram DO  Encounter Date: 2025   Encounter department: [facility]  :  CONSULTING PROVIDER:  Marta Zelaya PA-C  [address]   [address]  [place] [id number]    Assessment & Plan  Tic  Katya is a pleasant 25-year-old woman with PMH bipolar depression, anorexia nervosa presenting for abnormal head movements.  Reassuringly, she describes no other significant symptoms accompanying this change other than a brief stint of mild headaches which resolved quite sometime ago, and has no significant neurologic abnormalities on exam.  These movements themselves appear overall very mild, and do not really appear consistent with myoclonus to my eye.  Rather, the seem more akin to a tic-like movement; as such, my suspicion is higher for a primary psychiatric cause, either indirect new symptom of her other psychiatric disorders or an atypical tardive dyskinesia presentation after many years on Abilify.    As of now, I do not believe that the diagnostic yield of further testing would be high enough to merit pursuing additional workup from a neurologic standpoint, such as MRI  Will continue to monitor; if new symptoms arise or anything changes, we will consider additional neurologic testing at that time  Recommend continuing to follow with psychiatry; I do agree with the trial of weaning off of Abilify, and potentially trialing an alternate medication for her bipolar disorder  Given that this is mildly bothersome to her, we will trial a referral to physical therapy to see if any neck exercises/cervical based therapy may help with these movements.    Orders:    Ambulatory Referral to Neurology    Abnormal movements  See Tic    Orders:    Ambulatory Referral to Physical Therapy; Future        Follow-up:  She will Return in about 6 months (around 10/22/2025).    Thank you for allowing me to participate  "in the care of your patient.  If there are any questions regarding evaluation please feel free to reach out.       ________________________________________________________________________________________________    Subjective:    25 y.o. female with PMH bipolar depression, anorexia nervosa presenting for abnormal head movements.    She tells me the abnormal head movement started fairly suddenly in February. States that the frequency has been constant since February.     Endorses that the intensity\" is always the same - slight, brief head tilt to the right (right sidebend), lasts maybe 1 second or less when it occurs. Occurring every couple minutes.     -Denies pain  -Can't really identify any specific triggers  -Denied numbness/tingling/weakness  -Denies dysarthria or dysphagia  -Denies neck stiffness or restricted motion  -Denies vision changes  -Denies dizziness  -No other abnormal movements, twitches, jerks, myoclonus      Had been on Abilify for ~2 years, for Bipolar. Initially denied any medication changes, social changes, health changes or increased stressors in February around the time that this began, however chart review showed that she was having headaches around that time; these lasted on and off for ~2 weeks, but have since ceased.     Sleep:  -Works 2nd shift as  at a [employer] - [id number]  -Relaxation, own time is between 2330 and bedtime.   -Bedtime: 0200/0300  -Asleep: 1-2 hours. During that time, just lays there \"trying to sleep.\"    -Does endorse occasional rumination  -Wake: 1000  -TST24h: 5-7 hours        Psychiatry (through [facility]) is not sure whether the symptoms are related to medications or not, but as a result is trialing weaning her down    Pertinent Medications:  - Abilify 15 mg - now down to 5mg (as of 2 weeks ago)  - Zoloft 100 mg      Prior Medications:  - Prozac 40 mg (stopped ~2 weeks ago)        Labs:  2/12/2025:  CMP: Generally unremarkable  CBC: Generally unremarkable  TSH: " WNL at 2.372      Imaging/Procedures:   CT head w/o CON 2/17/2025:  IMPRESSION:     No acute intracranial abnormality.      Review of Systems I have personally reviewed the MA's review of systems and made changes as necessary.    Current Outpatient Medications on File Prior to Visit   Medication Sig Dispense Refill    Abilify 15 MG tablet       sertraline (ZOLOFT) 100 mg tablet       FLUoxetine (PROzac) 40 MG capsule Take 1 capsule by mouth daily (Patient not taking: Reported on 4/22/2025)       No current facility-administered medications on file prior to visit.         Objective   /62 (BP Location: Right arm, Patient Position: Sitting, Cuff Size: Child)   Pulse 88   Temp 98.5 °F (36.9 °C) (Tympanic Core)   Wt 46.3 kg (102 lb 1.6 oz)   SpO2 99%   BMI 22.29 kg/m²     Physical Exam  Constitutional:       General: She is awake.      Appearance: Normal appearance.   HENT:      Head: Normocephalic and atraumatic.   Eyes:      General: Lids are normal.      Extraocular Movements: Extraocular movements intact.      Pupils: Pupils are equal, round, and reactive to light.   Cardiovascular:      Comments: No evidence of respiratory distress, no accessory muscle use; no evidence of peripheral cyanosis    Neurological:      Mental Status: She is alert.      Motor: Motor strength is normal.     Coordination: Coordination is intact.      Deep Tendon Reflexes:      Reflex Scores:       Tricep reflexes are 2+ on the right side and 2+ on the left side.       Bicep reflexes are 2+ on the right side and 2+ on the left side.       Brachioradialis reflexes are 2+ on the right side and 2+ on the left side.       Patellar reflexes are 3+ on the right side and 3+ on the left side.       Achilles reflexes are 2+ on the right side and 2+ on the left side.  Psychiatric:         Speech: Speech normal.         Behavior: Behavior is cooperative.       Neurological Exam  Mental Status  Awake and alert. Speech is normal. Language is  "fluent with no aphasia. Attention and concentration are normal.    Cranial Nerves  CN II: Visual acuity is normal. Visual fields full to confrontation.  CN III, IV, VI: Extraocular movements intact bilaterally. Normal lids and orbits bilaterally. Pupils equal round and reactive to light bilaterally.  CN V: Facial sensation is normal.  CN VII: Full and symmetric facial movement.  CN VIII: Hearing is normal.  CN IX, X: Palate elevates symmetrically. Normal gag reflex.  CN XI: Shoulder shrug strength is normal. Good ROM as well, no apparent restricted motion in any plane.  CN XII: Tongue midline without atrophy or fasciculations.    Motor  Normal muscle bulk throughout. Normal muscle tone. The following abnormal movements were seen: Occasional sudden, mild, side bending of head to the right and a brief \"jerk.\"  This does not appear to follow any kind of pattern or appear consistent with tremor/titubation.  Possibly distractible, however was infrequent enough that this was unclear..   Strength is 5/5 throughout all four extremities.    Sensory  Light touch is normal in upper and lower extremities. Temperature is normal in upper and lower extremities.     Reflexes                                            Right                      Left  Brachioradialis                    2+                         2+  Biceps                                 2+                         2+  Triceps                                2+                         2+  Patellar                                3+                         3+  Achilles                                2+                         2+    Right pathological reflexes: Sulaiman's present. Tromner present. Ankle clonus absent.  Left pathological reflexes: Sulaiman's present. Tromner present. Ankle clonus absent.    Coordination    Finger-to-nose, rapid alternating movements and heel-to-shin normal bilaterally without dysmetria.    Gait  Normal casual, toe, heel and tandem " gait.        Administrative Statements   I have spent a total time of 45-50 minutes in caring for this patient on the day of the visit/encounter including Diagnostic results, Prognosis, Risks and benefits of tx options, Instructions for management, Patient and family education, Importance of tx compliance, Risk factor reductions, Documenting in the medical record, Reviewing/placing orders in the medical record (including tests, medications, and/or procedures), and Obtaining or reviewing history  .    Electronically signed by:    Alexsander Ingram DO  Board-Certified Neurology and Sleep Medicine  [facility]  04/22/25

## 2025-07-15 ENCOUNTER — TELEPHONE (OUTPATIENT)
Age: 25
End: 2025-07-15

## 2025-08-11 ENCOUNTER — VBI (OUTPATIENT)
Dept: ADMINISTRATIVE | Facility: OTHER | Age: 25
End: 2025-08-11